# Patient Record
Sex: FEMALE | URBAN - METROPOLITAN AREA
[De-identification: names, ages, dates, MRNs, and addresses within clinical notes are randomized per-mention and may not be internally consistent; named-entity substitution may affect disease eponyms.]

---

## 2021-10-13 ENCOUNTER — OFFICE VISIT (OUTPATIENT)
Dept: URBAN - METROPOLITAN AREA CLINIC 98 | Facility: CLINIC | Age: 41
End: 2021-10-13
Payer: COMMERCIAL

## 2021-10-13 VITALS
SYSTOLIC BLOOD PRESSURE: 121 MMHG | BODY MASS INDEX: 40.5 KG/M2 | DIASTOLIC BLOOD PRESSURE: 86 MMHG | WEIGHT: 175 LBS | HEIGHT: 55 IN | TEMPERATURE: 97.6 F

## 2021-10-13 DIAGNOSIS — E66.9 OBESITY (BMI 30.0-34.9): ICD-10-CM

## 2021-10-13 DIAGNOSIS — R10.13 EPIGASTRIC ABDOMINAL PAIN: ICD-10-CM

## 2021-10-13 DIAGNOSIS — K21.9 GERD WITHOUT ESOPHAGITIS: ICD-10-CM

## 2021-10-13 PROBLEM — 266435005: Status: ACTIVE | Noted: 2021-10-13

## 2021-10-13 PROBLEM — 443371000124107: Status: ACTIVE | Noted: 2021-10-13

## 2021-10-13 PROCEDURE — 99243 OFF/OP CNSLTJ NEW/EST LOW 30: CPT | Performed by: INTERNAL MEDICINE

## 2021-10-13 RX ORDER — PANTOPRAZOLE SODIUM 40 MG/1
1 TABLET TABLET, DELAYED RELEASE ORAL ONCE A DAY
Qty: 30 | Refills: 3 | OUTPATIENT
Start: 2021-10-15

## 2021-10-13 RX ORDER — B-COMPLEX WITH VITAMIN C
1 TABLET TABLET ORAL ONCE A DAY
Status: ACTIVE | COMMUNITY

## 2021-10-13 NOTE — HPI-TODAY'S VISIT:
Patient referred by Maria R Hernandez for evaluation of abdominal pain. Copy of this consult sent to Dr. Hernandez. 40 yo pt, originally from [facility],  w/ several weeks of heartburn, abdominal bloating, p-prandial dyspepsia and abdominal gas , distention, abdominal fullness w/ ocassional bilious emesis. Has nocturnal MALCOLM sxs and LUQ abdominal discomfort. No change in bowel pattern and denies melenic stools nor hematochezia. Has gained weight lately due to sedentary lifestyle. Recent labs: normal CBC, CMP, TSH. No COVID-19 exposure and has received her first dose of COVID-19 vaccine. No other complaints.

## 2021-10-15 ENCOUNTER — DASHBOARD ENCOUNTERS (OUTPATIENT)
Age: 41
End: 2021-10-15